# Patient Record
(demographics unavailable — no encounter records)

---

## 2025-04-07 NOTE — HISTORY OF PRESENT ILLNESS
[FreeTextEntry1] : Patient is a 35-year-old with an LMP of 2/28/2025 here for an annual gynecological follow-up. Office pregnancy test today is positive patient took one 2 days ago which was negative. This is not an anticipated welcome pregnancy.  Patient had been using withdrawal for birth control. Patient cycles are regular and she is feeling well.

## 2025-04-07 NOTE — PLAN
[FreeTextEntry1] : Patient here for an annual gynecological follow-up noting an office pregnancy test that was positive. Pap smear was done. Urine culture and STD culture sent. Rest of examination instructed. Current gestation 5 weeks and 3 days with concurrent sonographic findings.  Patient will follow-up in 2 weeks for gynecological follow-up and pregnancy dating. Instruction precautions given. Diet multivitamin iron folic acid discussed with patient. PHQ-9 depression screening score reviewed patient.  Her score is 1.  No significant impact on her quality of life and daily activity.  25 minutes spent with the patient discussing screening.

## 2025-04-07 NOTE — PHYSICAL EXAM
[Chaperoned Physical Exam] : A chaperone was present in the examining room during all aspects of the physical examination. [MA] : MA [Appropriately responsive] : appropriately responsive [Alert] : alert [No Acute Distress] : no acute distress [No Lymphadenopathy] : no lymphadenopathy [No Murmurs] : no murmurs [Soft] : soft [Non-tender] : non-tender [Non-distended] : non-distended [No HSM] : No HSM [No Lesions] : no lesions [No Mass] : no mass [Oriented x3] : oriented x3 [Examination Of The Breasts] : a normal appearance [No Masses] : no breast masses were palpable [Labia Majora] : normal [Labia Minora] : normal [Normal] : normal [Uterine Adnexae] : normal [FreeTextEntry2] : Ibeth Bell

## 2025-04-21 NOTE — PHYSICAL EXAM
[Chaperoned Physical Exam] : A chaperone was present in the examining room during all aspects of the physical examination. [MA] : MA [FreeTextEntry2] : dung [Appropriately responsive] : appropriately responsive [Alert] : alert [No Acute Distress] : no acute distress [No Lymphadenopathy] : no lymphadenopathy [Soft] : soft [Non-tender] : non-tender [Non-distended] : non-distended [No HSM] : No HSM [No Lesions] : no lesions [No Mass] : no mass [Oriented x3] : oriented x3 [Labia Majora] : normal [Labia Minora] : normal [Normal] : normal [Anteversion] : anteverted [Uterine Adnexae] : normal

## 2025-04-21 NOTE — PLAN
[FreeTextEntry1] : Patient here with amenorrhea and a positive home pregnancy test. Patient has a single intrauterine gestation size equal dates FH positive. LMP 2/28/2025 consistent with an estimated gestational age of 12/5/2025. Instruction precautions given. Diet multivitamin iron reviewed with patient. Prenatal care plan reviewed with patient. Patient for complete metabolic level q. 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 PCOS patient for early GCT and hemoglobin A1c All questions answered to the patient's satisfaction. Patient will follow-up in 1 month for routine prenatal care

## 2025-04-21 NOTE — HISTORY OF PRESENT ILLNESS
[FreeTextEntry1] : Patient is a 35-year-old with a last menstrual period of 2/28/2025 here for gynecological follow-up noting an answer of menses and the positive pregnancy test. Patient has regular menstrual cycles. Patient feeling well no nausea vomiting.  No bleeding or cramping. LMP of 2/28/2025 is consistent with an ESTHER of 12/5/2025 at 7 weeks and 3 days estimated gestational age